# Patient Record
Sex: FEMALE | Race: OTHER | HISPANIC OR LATINO | Employment: OTHER | URBAN - NONMETROPOLITAN AREA
[De-identification: names, ages, dates, MRNs, and addresses within clinical notes are randomized per-mention and may not be internally consistent; named-entity substitution may affect disease eponyms.]

---

## 2023-10-02 ENCOUNTER — TELEPHONE (OUTPATIENT)
Dept: CARDIOLOGY | Facility: CLINIC | Age: 78
End: 2023-10-02

## 2023-10-02 NOTE — TELEPHONE ENCOUNTER
Patient is having a total knee on 10/23/23 Needs Cardiac clearance per anesthesia.    Please advise

## 2024-01-19 PROBLEM — R01.1 CARDIAC MURMUR: Status: ACTIVE | Noted: 2024-01-19

## 2024-01-19 PROBLEM — I35.0 AORTIC STENOSIS, MODERATE: Status: ACTIVE | Noted: 2024-01-19

## 2024-01-19 RX ORDER — LEVOTHYROXINE SODIUM 50 UG/1
50 TABLET ORAL DAILY
COMMUNITY

## 2024-02-15 ENCOUNTER — HOSPITAL ENCOUNTER (OUTPATIENT)
Dept: CARDIOLOGY | Facility: CLINIC | Age: 79
Discharge: HOME | End: 2024-02-15
Payer: MEDICARE

## 2024-02-15 VITALS
BODY MASS INDEX: 27.82 KG/M2 | DIASTOLIC BLOOD PRESSURE: 70 MMHG | HEIGHT: 59 IN | SYSTOLIC BLOOD PRESSURE: 142 MMHG | WEIGHT: 138 LBS

## 2024-02-15 DIAGNOSIS — I35.0 AORTIC VALVE STENOSIS, ETIOLOGY OF CARDIAC VALVE DISEASE UNSPECIFIED: ICD-10-CM

## 2024-02-15 LAB
AORTIC VALVE MEAN GRADIENT: 34 MMHG
AORTIC VALVE PEAK VELOCITY: 4.04 M/S
AV PEAK GRADIENT: 65.3 MMHG
AVA (PEAK VEL): 0.66 CM2
AVA (VTI): 0.65 CM2
EJECTION FRACTION APICAL 4 CHAMBER: 84.9
LEFT VENTRICLE INTERNAL DIMENSION DIASTOLE: 3.5 CM (ref 3.5–6)
LEFT VENTRICULAR OUTFLOW TRACT DIAMETER: 1.8 CM
MITRAL VALVE E/A RATIO: 0.73
MITRAL VALVE E/E' RATIO: 25.7
RIGHT VENTRICLE PEAK SYSTOLIC PRESSURE: 28 MMHG

## 2024-02-15 PROCEDURE — 93306 TTE W/DOPPLER COMPLETE: CPT

## 2024-02-15 PROCEDURE — 93306 TTE W/DOPPLER COMPLETE: CPT | Performed by: INTERNAL MEDICINE

## 2024-02-29 ENCOUNTER — OFFICE VISIT (OUTPATIENT)
Dept: CARDIOLOGY | Facility: CLINIC | Age: 79
End: 2024-02-29
Payer: MEDICARE

## 2024-02-29 VITALS
BODY MASS INDEX: 25.49 KG/M2 | HEART RATE: 82 BPM | WEIGHT: 135 LBS | DIASTOLIC BLOOD PRESSURE: 75 MMHG | HEIGHT: 61 IN | SYSTOLIC BLOOD PRESSURE: 165 MMHG

## 2024-02-29 DIAGNOSIS — I35.0 NONRHEUMATIC AORTIC VALVE STENOSIS: Primary | ICD-10-CM

## 2024-02-29 DIAGNOSIS — I10 ESSENTIAL HYPERTENSION: ICD-10-CM

## 2024-02-29 DIAGNOSIS — E66.3 OVERWEIGHT WITH BODY MASS INDEX (BMI) 25.0-29.9: ICD-10-CM

## 2024-02-29 DIAGNOSIS — Z87.891 FORMER SMOKER: ICD-10-CM

## 2024-02-29 PROCEDURE — 3078F DIAST BP <80 MM HG: CPT | Performed by: INTERNAL MEDICINE

## 2024-02-29 PROCEDURE — 1159F MED LIST DOCD IN RCRD: CPT | Performed by: INTERNAL MEDICINE

## 2024-02-29 PROCEDURE — 99214 OFFICE O/P EST MOD 30 MIN: CPT | Performed by: INTERNAL MEDICINE

## 2024-02-29 PROCEDURE — 3077F SYST BP >= 140 MM HG: CPT | Performed by: INTERNAL MEDICINE

## 2024-02-29 NOTE — LETTER
February 29, 2024     Hailey Rivers PA-C  [address] [place] [id number]    Patient: Jody Liu   YOB: 1945   Date of Visit: 2/29/2024       Dear Dr. Hailey Rivers PA-C:    Thank you for referring Jody Liu to me for evaluation. Below are my notes for this consultation.  If you have questions, please do not hesitate to call me. I look forward to following your patient along with you.       Sincerely,     Austen Taveras MD      CC: No Recipients  ______________________________________________________________________________________    Subjective   Jody Liu is a 78 y.o. female       Chief Complaint    Annual Exam          HPI   Patient is in the office for follow-up for aortic valve disease.  She is known to have aortic stenosis which based on recent echocardiogram demonstrated progression from moderate to severe.  She still has no symptoms consistent with aortic stenosis complication.  She has no orthopnea PND or palpitations and no chest pain and no dyspnea on exertion.  She had knee surgery in the past and she has been recovering well from the surgery.  I reviewed with the patient the echocardiographic findings and the natural history of aortic stenosis pointing out that at this stage she is getting closer to having an intervention which would likely be TAVR.  Her examination today is remarkable for aortic stenosis murmur along with borderline overweight.  Patient was found to be hypertensive as well which has not been noted previously.  I did point out to the patient that hypertension XLR8 aortic stenosis when its present.    Assessment/recommendations:     1-severe aortic stenosis confirmed by a repeat echocardiogram December 2023.  The velocity just exceeded 4 m/s.  To assess the significance of this finding I advised patient to have a treadmill stress test and based on that recommend on whether timing for surgery is approaching or not.  Will place her on a modified  "Eyal protocol.  The patient was advised at her age is not in her favor if we wait for too long.  I anticipate that TAVR will need to be done within 1 year  2-slight overweight which in her case not consequential. Patient has active lifestyle  3-hypothyroidism on replacement therapy followed by PCP  4-essential hypertension, the patient preferred to wait on therapy.  I did tell the patient that when she comes in for the treadmill stress test will check her blood pressure reading if it remains elevated she need to be on medical therapy and she agreed  Review of Systems   All other systems reviewed and are negative.           Vitals:    02/29/24 0912 02/29/24 0955   BP: 148/88 165/75   BP Location: Left arm    Patient Position: Sitting    Pulse: 82    Weight: 61.2 kg (135 lb)    Height: 1.549 m (5' 1\")         Objective   Physical Exam  Constitutional:       Appearance: Normal appearance.   HENT:      Nose: Nose normal.   Neck:      Vascular: No carotid bruit.   Cardiovascular:      Rate and Rhythm: Normal rate.      Pulses: Normal pulses.      Heart sounds: Normal heart sounds.      Comments: 2-3/6 systolic murmur  Pulmonary:      Effort: Pulmonary effort is normal.   Abdominal:      General: Bowel sounds are normal.      Palpations: Abdomen is soft.   Musculoskeletal:         General: Normal range of motion.      Cervical back: Normal range of motion.      Right lower leg: No edema.      Left lower leg: No edema.   Skin:     General: Skin is warm and dry.   Neurological:      General: No focal deficit present.      Mental Status: She is alert.   Psychiatric:         Mood and Affect: Mood normal.         Behavior: Behavior normal.         Thought Content: Thought content normal.         Judgment: Judgment normal.         Allergies  Patient has no known allergies.     Current Medications    Current Outpatient Medications:   •  levothyroxine (Synthroid) 50 mcg tablet, Take 1 tablet (50 mcg) by mouth once daily., Disp: " , Rfl:                      Assessment/Plan   1. Nonrheumatic aortic valve stenosis  Follow Up In Cardiology    Stress Test    Follow Up In Cardiology      2. Overweight with body mass index (BMI) 25.0-29.9        3. Former smoker        4. Essential hypertension                 Scribe Attestation  By signing my name below, Izabela THOMPSON LPN, Scribe   attest that this documentation has been prepared under the direction and in the presence of Austen Taveras MD.     Provider Attestation - Scribe documentation    All medical record entries made by the Scribe were at my direction and personally dictated by me. I have reviewed the chart and agree that the record accurately reflects my personal performance of the history, physical exam, discussion and plan.

## 2024-02-29 NOTE — PATIENT INSTRUCTIONS
Please bring all medicines, vitamins, and herbal supplements with you when you come to the office.    Prescriptions will not be filled unless you are compliant with your follow up appointments or have a follow up appointment scheduled as per instruction of your physician. Refills should be requested at the time of your visit.     BMI was above normal measurement. Current weight: 61.2 kg (135 lb)  Weight change since last visit (-) denotes wt loss -3 lbs   Weight loss needed to achieve BMI 25: 3 Lbs  Weight loss needed to achieve BMI 30: -23.4 Lbs  Provided instructions on dietary changes  Provided instructions on exercise    .

## 2024-02-29 NOTE — PROGRESS NOTES
Subjective   Jody Liu is a 78 y.o. female       Chief Complaint    Annual Exam          HPI   Patient is in the office for follow-up for aortic valve disease.  She is known to have aortic stenosis which based on recent echocardiogram demonstrated progression from moderate to severe.  She still has no symptoms consistent with aortic stenosis complication.  She has no orthopnea PND or palpitations and no chest pain and no dyspnea on exertion.  She had knee surgery in the past and she has been recovering well from the surgery.  I reviewed with the patient the echocardiographic findings and the natural history of aortic stenosis pointing out that at this stage she is getting closer to having an intervention which would likely be TAVR.  Her examination today is remarkable for aortic stenosis murmur along with borderline overweight.  Patient was found to be hypertensive as well which has not been noted previously.  I did point out to the patient that hypertension XLR8 aortic stenosis when its present.    Assessment/recommendations:     1-severe aortic stenosis confirmed by a repeat echocardiogram December 2023.  The velocity just exceeded 4 m/s.  To assess the significance of this finding I advised patient to have a treadmill stress test and based on that recommend on whether timing for surgery is approaching or not.  Will place her on a modified Eyal protocol.  The patient was advised at her age is not in her favor if we wait for too long.  I anticipate that TAVR will need to be done within 1 year  2-slight overweight which in her case not consequential. Patient has active lifestyle  3-hypothyroidism on replacement therapy followed by PCP  4-essential hypertension, the patient preferred to wait on therapy.  I did tell the patient that when she comes in for the treadmill stress test will check her blood pressure reading if it remains elevated she need to be on medical therapy and she agreed  Review of Systems  "  All other systems reviewed and are negative.           Vitals:    02/29/24 0912 02/29/24 0955   BP: 148/88 165/75   BP Location: Left arm    Patient Position: Sitting    Pulse: 82    Weight: 61.2 kg (135 lb)    Height: 1.549 m (5' 1\")         Objective   Physical Exam  Constitutional:       Appearance: Normal appearance.   HENT:      Nose: Nose normal.   Neck:      Vascular: No carotid bruit.   Cardiovascular:      Rate and Rhythm: Normal rate.      Pulses: Normal pulses.      Heart sounds: Normal heart sounds.      Comments: 2-3/6 systolic murmur  Pulmonary:      Effort: Pulmonary effort is normal.   Abdominal:      General: Bowel sounds are normal.      Palpations: Abdomen is soft.   Musculoskeletal:         General: Normal range of motion.      Cervical back: Normal range of motion.      Right lower leg: No edema.      Left lower leg: No edema.   Skin:     General: Skin is warm and dry.   Neurological:      General: No focal deficit present.      Mental Status: She is alert.   Psychiatric:         Mood and Affect: Mood normal.         Behavior: Behavior normal.         Thought Content: Thought content normal.         Judgment: Judgment normal.         Allergies  Patient has no known allergies.     Current Medications    Current Outpatient Medications:     levothyroxine (Synthroid) 50 mcg tablet, Take 1 tablet (50 mcg) by mouth once daily., Disp: , Rfl:                      Assessment/Plan   1. Nonrheumatic aortic valve stenosis  Follow Up In Cardiology    Stress Test    Follow Up In Cardiology      2. Overweight with body mass index (BMI) 25.0-29.9        3. Former smoker        4. Essential hypertension                 Scribe Attestation  By signing my name below, Izabela THOMPSON LPN, Scribjose   attest that this documentation has been prepared under the direction and in the presence of Austen Taveras MD.     Provider Attestation - Scribe documentation    All medical record entries made by the Scribe were at " my direction and personally dictated by me. I have reviewed the chart and agree that the record accurately reflects my personal performance of the history, physical exam, discussion and plan.

## 2024-03-05 ENCOUNTER — HOSPITAL ENCOUNTER (OUTPATIENT)
Dept: CARDIOLOGY | Facility: CLINIC | Age: 79
Discharge: HOME | End: 2024-03-05
Payer: MEDICARE

## 2024-03-05 ENCOUNTER — CLINICAL SUPPORT (OUTPATIENT)
Dept: CARDIOLOGY | Facility: CLINIC | Age: 79
End: 2024-03-05
Payer: MEDICARE

## 2024-03-05 VITALS
BODY MASS INDEX: 25.11 KG/M2 | HEIGHT: 61 IN | SYSTOLIC BLOOD PRESSURE: 122 MMHG | HEART RATE: 68 BPM | DIASTOLIC BLOOD PRESSURE: 74 MMHG | WEIGHT: 133 LBS

## 2024-03-05 VITALS — DIASTOLIC BLOOD PRESSURE: 86 MMHG | HEART RATE: 71 BPM | SYSTOLIC BLOOD PRESSURE: 132 MMHG

## 2024-03-05 DIAGNOSIS — I35.0 NONRHEUMATIC AORTIC VALVE STENOSIS: ICD-10-CM

## 2024-03-05 PROCEDURE — 99211 OFF/OP EST MAY X REQ PHY/QHP: CPT | Performed by: INTERNAL MEDICINE

## 2024-03-05 PROCEDURE — 93016 CV STRESS TEST SUPVJ ONLY: CPT | Performed by: INTERNAL MEDICINE

## 2024-03-05 PROCEDURE — 93017 CV STRESS TEST TRACING ONLY: CPT

## 2024-03-05 PROCEDURE — 93018 CV STRESS TEST I&R ONLY: CPT | Performed by: INTERNAL MEDICINE

## 2024-03-05 RX ORDER — MAGNESIUM 250 MG
1 TABLET ORAL DAILY
COMMUNITY

## 2024-03-05 RX ORDER — MULTIVIT-MIN/IRON FUM/FOLIC AC 7.5 MG-4
1 TABLET ORAL DAILY
COMMUNITY

## 2024-03-05 RX ORDER — NICOTINE POLACRILEX 2 MG
1 GUM BUCCAL DAILY
COMMUNITY

## 2024-03-05 NOTE — PROGRESS NOTES
"Patient here for at BP visit ordered by Dr. Taveras due to hypertension. Dr. Stevens in suite physician. Patient here due to elevated BP. No new medication was started. Medication list Updated with list . Patient has no cardiac complaints at time of visit. Discussed with CARINA Guillory RN prior to discharge.     To Dr. Taveras for review    To Dr. Stevens to sign as in suite        Vitals:    03/05/24 1112 03/05/24 1118   BP: 120/70 122/74   BP Location: Left arm Right arm   Patient Position: Sitting Sitting   Pulse: 68 68   Height: 1.549 m (5' 1\")        "

## 2024-09-05 ENCOUNTER — APPOINTMENT (OUTPATIENT)
Dept: CARDIOLOGY | Facility: CLINIC | Age: 79
End: 2024-09-05
Payer: MEDICARE

## 2024-09-20 ENCOUNTER — APPOINTMENT (OUTPATIENT)
Dept: CARDIOLOGY | Facility: CLINIC | Age: 79
End: 2024-09-20
Payer: MEDICARE

## 2024-09-20 VITALS
DIASTOLIC BLOOD PRESSURE: 72 MMHG | BODY MASS INDEX: 24.73 KG/M2 | HEART RATE: 64 BPM | WEIGHT: 131 LBS | HEIGHT: 61 IN | SYSTOLIC BLOOD PRESSURE: 136 MMHG

## 2024-09-20 DIAGNOSIS — I35.0 AORTIC STENOSIS, MODERATE: ICD-10-CM

## 2024-09-20 DIAGNOSIS — E03.9 ACQUIRED HYPOTHYROIDISM: ICD-10-CM

## 2024-09-20 DIAGNOSIS — I35.0 NONRHEUMATIC AORTIC VALVE STENOSIS: ICD-10-CM

## 2024-09-20 DIAGNOSIS — Z87.891 FORMER SMOKER: ICD-10-CM

## 2024-09-20 PROBLEM — R01.1 CARDIAC MURMUR: Status: RESOLVED | Noted: 2024-01-19 | Resolved: 2024-09-20

## 2024-09-20 PROBLEM — E66.3 OVERWEIGHT WITH BODY MASS INDEX (BMI) 25.0-29.9: Status: RESOLVED | Noted: 2024-02-29 | Resolved: 2024-09-20

## 2024-09-20 PROCEDURE — 1036F TOBACCO NON-USER: CPT | Performed by: INTERNAL MEDICINE

## 2024-09-20 PROCEDURE — 99213 OFFICE O/P EST LOW 20 MIN: CPT | Performed by: INTERNAL MEDICINE

## 2024-09-20 PROCEDURE — 1159F MED LIST DOCD IN RCRD: CPT | Performed by: INTERNAL MEDICINE

## 2024-09-20 NOTE — LETTER
"September 20, 2024     Hailey Rivers PA-C  [address] [place] [address]    Patient: Jody Liu   YOB: 1945   Date of Visit: 9/20/2024       Dear Dr. Hailey Rivers PA-C:    Thank you for referring Jody Liu to me for evaluation. Below are my notes for this consultation.  If you have questions, please do not hesitate to call me. I look forward to following your patient along with you.       Sincerely,     Austen Taveras MD      CC: No Recipients  ______________________________________________________________________________________    Subjective   Jody Liu is a 79 y.o. female       Chief Complaint    Follow-up          HPI   Patient is in the office for follow-up for aortic stenosis.  Since her last visit she had a treadmill stress test, she did not go over 4 minutes on the treadmill and had to stop for knee pain.  During the time on the treadmill there was no ischemic changes, no arrhythmias and no chest pain.  She remains asymptomatic as far as aortic stenosis is concerned.  Her last echocardiogram was December 2023.  Advised patient to repeat the echocardiogram in November 2024 and will likely require TAVR in the near future.    Assessment/recommendations:     1-severe aortic stenosis confirmed by echocardiogram December 2023.  The velocity just exceeded 4 m/s.  Limited treadmill stress test in February 2024 demonstrated limited capacity due to orthopedic problems not cardiovascular problems.  She remains asymptomatic.  Repeat echocardiogram in November 2024 will be scheduled  He required intervention to replace aortic valve.  2.  Hypothyroidism on replacement therapy.  Review of Systems   All other systems reviewed and are negative.           Vitals:    09/20/24 1511   BP: 136/72   BP Location: Left arm   Patient Position: Sitting   Pulse: 64   Weight: 59.4 kg (131 lb)   Height: 1.549 m (5' 1\")        Objective   Physical Exam  Constitutional:       Appearance: " Normal appearance.   HENT:      Nose: Nose normal.   Neck:      Vascular: No carotid bruit.   Cardiovascular:      Rate and Rhythm: Normal rate.      Pulses: Normal pulses.      Heart sounds: Murmur heard.      Systolic murmur is present with a grade of 3/6.   Pulmonary:      Effort: Pulmonary effort is normal.   Abdominal:      General: Bowel sounds are normal.      Palpations: Abdomen is soft.   Musculoskeletal:         General: Normal range of motion.      Cervical back: Normal range of motion.      Right lower leg: No edema.      Left lower leg: No edema.   Skin:     General: Skin is warm and dry.   Neurological:      General: No focal deficit present.      Mental Status: She is alert.   Psychiatric:         Mood and Affect: Mood normal.         Behavior: Behavior normal.         Thought Content: Thought content normal.         Judgment: Judgment normal.         Allergies  Patient has no known allergies.     Current Medications    Current Outpatient Medications:   •  biotin 1 mg capsule, Take 1 capsule (1 mg) by mouth once daily., Disp: , Rfl:   •  levothyroxine (Synthroid) 50 mcg tablet, Take 1 tablet (50 mcg) by mouth once daily., Disp: , Rfl:   •  naproxen sodium (ALEVE ORAL), Take 440 mg by mouth if needed., Disp: , Rfl:                      Assessment/Plan   1. Nonrheumatic aortic valve stenosis  Follow Up In Cardiology      2. Aortic stenosis, moderate        3. Acquired hypothyroidism        4. BMI 24.0-24.9, adult        5. Former smoker                 Scribe Attestation  By signing my name below, Jeni THOMPSON LPN, Scribe   attest that this documentation has been prepared under the direction and in the presence of Austen Taveras MD.     Provider Attestation - Scribe documentation    All medical record entries made by the Scribe were at my direction and personally dictated by me. I have reviewed the chart and agree that the record accurately reflects my personal performance of the history, physical  exam, discussion and plan.

## 2024-09-20 NOTE — PROGRESS NOTES
"Subjective   Jody Liu is a 79 y.o. female       Chief Complaint    Follow-up          HPI   Patient is in the office for follow-up for aortic stenosis.  Since her last visit she had a treadmill stress test, she did not go over 4 minutes on the treadmill and had to stop for knee pain.  During the time on the treadmill there was no ischemic changes, no arrhythmias and no chest pain.  She remains asymptomatic as far as aortic stenosis is concerned.  Her last echocardiogram was December 2023.  Advised patient to repeat the echocardiogram in November 2024 and will likely require TAVR in the near future.    Assessment/recommendations:     1-severe aortic stenosis confirmed by echocardiogram December 2023.  The velocity just exceeded 4 m/s.  Limited treadmill stress test in February 2024 demonstrated limited capacity due to orthopedic problems not cardiovascular problems.  She remains asymptomatic.  Repeat echocardiogram in November 2024 will be scheduled  He required intervention to replace aortic valve.  2.  Hypothyroidism on replacement therapy.  Review of Systems   All other systems reviewed and are negative.           Vitals:    09/20/24 1511   BP: 136/72   BP Location: Left arm   Patient Position: Sitting   Pulse: 64   Weight: 59.4 kg (131 lb)   Height: 1.549 m (5' 1\")        Objective   Physical Exam  Constitutional:       Appearance: Normal appearance.   HENT:      Nose: Nose normal.   Neck:      Vascular: No carotid bruit.   Cardiovascular:      Rate and Rhythm: Normal rate.      Pulses: Normal pulses.      Heart sounds: Murmur heard.      Systolic murmur is present with a grade of 3/6.   Pulmonary:      Effort: Pulmonary effort is normal.   Abdominal:      General: Bowel sounds are normal.      Palpations: Abdomen is soft.   Musculoskeletal:         General: Normal range of motion.      Cervical back: Normal range of motion.      Right lower leg: No edema.      Left lower leg: No edema.   Skin:     " General: Skin is warm and dry.   Neurological:      General: No focal deficit present.      Mental Status: She is alert.   Psychiatric:         Mood and Affect: Mood normal.         Behavior: Behavior normal.         Thought Content: Thought content normal.         Judgment: Judgment normal.         Allergies  Patient has no known allergies.     Current Medications    Current Outpatient Medications:     biotin 1 mg capsule, Take 1 capsule (1 mg) by mouth once daily., Disp: , Rfl:     levothyroxine (Synthroid) 50 mcg tablet, Take 1 tablet (50 mcg) by mouth once daily., Disp: , Rfl:     naproxen sodium (ALEVE ORAL), Take 440 mg by mouth if needed., Disp: , Rfl:                      Assessment/Plan   1. Nonrheumatic aortic valve stenosis  Follow Up In Cardiology      2. Aortic stenosis, moderate        3. Acquired hypothyroidism        4. BMI 24.0-24.9, adult        5. Former smoker                 Scribe Attestation  By signing my name below, IJeni LPN  , Scribe   attest that this documentation has been prepared under the direction and in the presence of Austen Taveras MD.     Provider Attestation - Scribe documentation    All medical record entries made by the Scribe were at my direction and personally dictated by me. I have reviewed the chart and agree that the record accurately reflects my personal performance of the history, physical exam, discussion and plan.

## 2024-11-07 ENCOUNTER — HOSPITAL ENCOUNTER (OUTPATIENT)
Dept: CARDIOLOGY | Facility: CLINIC | Age: 79
Discharge: HOME | End: 2024-11-07
Payer: MEDICARE

## 2024-11-07 VITALS
BODY MASS INDEX: 24.73 KG/M2 | WEIGHT: 131 LBS | SYSTOLIC BLOOD PRESSURE: 132 MMHG | HEIGHT: 61 IN | DIASTOLIC BLOOD PRESSURE: 78 MMHG

## 2024-11-07 DIAGNOSIS — I35.0 AORTIC STENOSIS, MODERATE: ICD-10-CM

## 2024-11-07 PROCEDURE — 93306 TTE W/DOPPLER COMPLETE: CPT

## 2024-11-07 PROCEDURE — 93306 TTE W/DOPPLER COMPLETE: CPT | Performed by: INTERNAL MEDICINE

## 2024-11-08 LAB
AORTIC VALVE MEAN GRADIENT: 37 MMHG
AORTIC VALVE PEAK VELOCITY: 4.18 M/S
AV PEAK GRADIENT: 70 MMHG
AVA (PEAK VEL): 0.66 CM2
AVA (VTI): 0.57 CM2
EJECTION FRACTION APICAL 4 CHAMBER: 82.5
EJECTION FRACTION: 73 %
LEFT VENTRICLE INTERNAL DIMENSION DIASTOLE: 3.26 CM (ref 3.5–6)
LEFT VENTRICULAR OUTFLOW TRACT DIAMETER: 1.9 CM
LV EJECTION FRACTION BIPLANE: 81 %
MITRAL VALVE E/A RATIO: 0.82
MITRAL VALVE E/E' RATIO: 27.9
RIGHT VENTRICLE PEAK SYSTOLIC PRESSURE: 33.9 MMHG

## 2024-11-11 ENCOUNTER — TELEPHONE (OUTPATIENT)
Dept: CARDIOLOGY | Facility: CLINIC | Age: 79
End: 2024-11-11
Payer: MEDICARE

## 2024-11-11 NOTE — TELEPHONE ENCOUNTER
Result Communication    Resulted Orders   Transthoracic Echo Complete   Result Value Ref Range    AV mn grad 37 mmHg    AV pk hemant 4.18 m/s    LV Biplane EF 81 %    LVOT diam 1.90 cm    MV E/A ratio 0.82     MV avg E/e' ratio 27.90     LV EF 73 %    LVIDd 3.26 cm    RVSP 33.9 mmHg    Aortic Valve Area by Continuity of Peak Velocity 0.66 cm2    AV pk grad 70 mmHg    Aortic Valve Area by Continuity of VTI 0.57 cm2    LV A4C EF 82.5     Narrative                   [address], [address] [place] [address]          Tel [phone] Fax [phone]    TRANSTHORACIC ECHOCARDIOGRAM REPORT    Patient Name:         ROSAURA BARNES      Reading Physician:    [id number] Genie Taveras MD,                                                                   [facility]  Study Date:           11/7/2024            Ordering Provider:    [id number] GENIE TAVERAS  MRN/PID:              [id number]             Fellow:  Accession#:           [id number]         Nurse:  Date of Birth/Age:    1945 / 79 years Sonographer:          PAMELA  Gender Assigned at    F                    Additional Staff:  Birth:  Height:               154.94 cm            Admit Date:  Weight:               59.42 kg             Admission Status:  BSA / BMI:            1.58 m2 / 24.75      Department Location:                        kg/m2  Blood Pressure: 132 /78 mmHg    Study Type:    TRANSTHORACIC ECHO (TTE) COMPLETE  Diagnosis/ICD: Nonrheumatic aortic (valve) stenosis-I35.0  Indication:    Hypothyroid, 3/6 Systolic Murmur, Former Smoker  CPT Codes:     Echo Complete w Full [id number]   Study Detail: The following Echo studies were performed: 2D, M-Mode, Doppler and                color flow.       PHYSICIAN INTERPRETATION:  Left Ventricle: Left ventricular ejection fraction is normal, by visual estimate at  70-75%. There are no regional wall motion abnormalities. The left ventricular cavity size is normal. There is severely increased septal and mildly increased posterior left ventricular wall thickness. There is left ventricular concentric remodeling. Spectral Doppler shows a Grade I (impaired relaxation pattern) of left ventricular diastolic filling with an elevated left atrial pressure. Mild concentric left ventricle hypertrophy.  Left Atrium: The left atrium is normal in size.  Right Ventricle: The right ventricle is normal in size. There is normal right ventricular global systolic function.  Right Atrium: The right atrium is normal in size.  Aortic Valve: The aortic valve appears abnormal. There is moderate to severe aortic valve thickening. There is evidence of severe aortic valve stenosis.  The aortic valve dimensionless index is 0.20. There is no evidence of aortic valve regurgitation. The peak instantaneous gradient of the aortic valve is 70 mmHg. The mean gradient of the aortic valve is 37 mmHg. Peak velocity across the aortic valve measured 418 cm/s corresponding to a peak pressure gradient of 70 mmHg and a mean pressure gradient 37 mmHg.  Mitral Valve: The mitral valve is moderately thickened. There is mild mitral annular calcification. The peak instantaneous gradient of the mitral valve is 14 mmHg. There is no evidence of mitral valve regurgitation.  Tricuspid Valve: The tricuspid valve is structurally normal. No evidence of tricuspid regurgitation.  Pulmonic Valve: The pulmonic valve is structurally normal. There is no indication of pulmonic valve regurgitation.  Pericardium: No pericardial effusion noted.  Aorta: The aortic root is normal.  Pulmonary Artery: The Doppler estimated pulmonary artery diastolic pressure is 14.3 mmHg.  In comparison to the previous echocardiogram(s): When compared to a study from 2/15/2024, the velocity across the aortic valve has slightly increased.       CONCLUSIONS:   1.  Left ventricular ejection fraction is normal, by visual estimate at 70-75%.   2. Mild concentric left ventricle hypertrophy.   3. There is normal right ventricular global systolic function.   4. The mitral valve is moderately thickened.   5. Peak velocity across the aortic valve measured 418 cm/s corresponding to a peak pressure gradient of 70 mmHg and a mean pressure gradient 37 mmHg.   6. Severe aortic valve stenosis.   7. There is moderate to severe aortic valve thickening.   8. When compared to a study from 2/15/2024, the velocity across the aortic valve has slightly increased.   9. There is severely increased septal thickness.    QUANTITATIVE DATA SUMMARY:     2D MEASUREMENTS:           Normal Ranges:  Ao Root d:       2.20 cm   (2.0-3.7cm)  LAs:             3.10 cm   (2.7-4.0cm)  RVIDd:           1.84 cm   (0.9-3.6cm)  IVSd:            1.51 cm   (0.6-1.1cm)  LVPWd:           1.01 cm   (0.6-1.1cm)  LVIDd:           3.26 cm   (3.9-5.9cm)  LVIDs:           2.11 cm  LV Mass Index:   83.9 g/m2  LV % FS          35.3 %       LV SYSTOLIC FUNCTION BY 2D PLANIMETRY (MOD):                       Normal Ranges:  EF-A4C View:    82 % (>=55%)  EF-A2C View:    80 %  EF-Biplane:     81 %  EF-Visual:      73 %  LV EF Reported: 73 %       LV DIASTOLIC FUNCTION:           Normal Ranges:  MV Peak E:             1.14 m/s  (0.7-1.2 m/s)  MV Peak A:             1.39 m/s  (0.42-0.7 m/s)  E/A Ratio:             0.82      (1.0-2.2)  MV e'                  0.044 m/s (>8.0)  MV lateral e'          0.04 m/s  MV medial e'           0.05 m/s  E/e' Ratio:            25.70     (<8.0)       MITRAL VALVE:           Normal Ranges:  MV Vmax:      1.87 m/s  (<=1.3m/s)  MV peak P.0 mmHg (<5mmHg)  MV mean P.0 mmHg  (<48mmHg)       MITRAL INSUFFICIENCY:             Normal Ranges:  MR Vmax:              304.00 cm/s       AORTIC VALVE:                      Normal Ranges:  AoV Vmax:                4.18 m/s  (<=1.7m/s)  AoV Peak PG:              69.9 mmHg (<20mmHg)  AoV Mean P.0 mmHg (1.7-11.5mmHg)  LVOT Max Wilfredo:            0.98 m/s  (<=1.1m/s)  AoV VTI:                 110.00 cm (18-25cm)  LVOT VTI:                22.30 cm  LVOT Diameter:           1.90 cm   (1.8-2.4cm)  AoV Area, VTI:           0.57 cm2  (2.5-5.5cm2)  AoV Area,Vmax:           0.66 cm2  (2.5-4.5cm2)  AoV Dimensionless Index: 0.20       TRICUSPID VALVE/RVSP:          Normal Ranges:  Peak TR Velocity:     2.78 m/s  RV Syst Pressure:     34 mmHg  (< 30mmHg)       PULMONIC VALVE:           Normal Ranges:  PV Max Wilfredo:     0.7 m/s   (0.6-0.9m/s)  PV Max P.2 mmHg  PIEDV:          1.68 m/s  PADP:           14.3 mmHg       [id number] Austen Taveras MD, FACC  Electronically signed on 2024 at 6:20:19 PM         ** Final **

## 2024-11-11 NOTE — TELEPHONE ENCOUNTER
----- Message from Austen Taveras sent at 11/10/2024  2:39 PM EST -----  Let her know the echo shows no changes from previously, if she remains without any symptoms there is nothing to be done at this moment  ----- Message -----  From: Interface, [facility] - Cardiology Results In  Sent: 11/8/2024   6:20 PM EST  To: Austen Taveras MD

## 2025-04-11 ENCOUNTER — APPOINTMENT (OUTPATIENT)
Dept: CARDIOLOGY | Facility: CLINIC | Age: 80
End: 2025-04-11
Payer: MEDICARE

## 2025-05-06 ENCOUNTER — APPOINTMENT (OUTPATIENT)
Dept: CARDIOLOGY | Facility: CLINIC | Age: 80
End: 2025-05-06
Payer: MEDICARE

## 2025-05-06 VITALS
SYSTOLIC BLOOD PRESSURE: 124 MMHG | WEIGHT: 126.6 LBS | BODY MASS INDEX: 23.9 KG/M2 | DIASTOLIC BLOOD PRESSURE: 80 MMHG | HEIGHT: 61 IN | HEART RATE: 70 BPM

## 2025-05-06 DIAGNOSIS — E03.9 ACQUIRED HYPOTHYROIDISM: ICD-10-CM

## 2025-05-06 DIAGNOSIS — Z87.891 FORMER SMOKER: ICD-10-CM

## 2025-05-06 DIAGNOSIS — I35.0 AORTIC STENOSIS, MODERATE: ICD-10-CM

## 2025-05-06 PROCEDURE — 1036F TOBACCO NON-USER: CPT | Performed by: INTERNAL MEDICINE

## 2025-05-06 PROCEDURE — 99213 OFFICE O/P EST LOW 20 MIN: CPT | Performed by: INTERNAL MEDICINE

## 2025-05-06 PROCEDURE — 1159F MED LIST DOCD IN RCRD: CPT | Performed by: INTERNAL MEDICINE

## 2025-05-06 NOTE — PROGRESS NOTES
"Chief Complaint   Patient presents with    Follow-up     6 month, aortic stenosis        Subjective   Jody Liu is a 79 y.o. female     HPI     Patient is in the office for follow-up for aortic stenosis.  Back in November 2020 for her last echocardiogram revealed severe aortic stenosis and a velocity of 418 cm/s.  The patient maintains active lifestyle and send currently denies any dyspnea palpitations chest pain syncope or near syncope.  Her only other medical problem is hypothyroidism on replacement therapy managed by her PCP.  Her examination is consistent with severe aortic stenosis.  Lungs are clear, no lower extremity edema.     Assessment/recommendations:     1-severe aortic stenosis confirmed by echocardiogram November 2024, peak velocity 418 cm/s with normal ejection fraction and remains asymptomatic.  Discussed with the patient the natural history of aortic stenosis and the fact that this valve is progressively getting smaller and likely require a replacement in the near future.  Due to the absence of symptoms at the present time I suggested repeating the echocardiogram this coming summer and based on that decide whether surgery is going to be needed.  Meanwhile symptoms of progressive aortic stenosis were explained to the patient and was advised to report if any of the symptoms occur.  2.  Hypothyroidism on replacement therapy.  Review of Systems   All other systems reviewed and are negative.           Vitals:    05/06/25 1135   BP: 124/80   BP Location: Left arm   Patient Position: Sitting   Pulse: 70   Weight: 57.4 kg (126 lb 9.6 oz)   Height: 1.549 m (5' 1\")        Objective   Physical Exam  Constitutional:       Appearance: Normal appearance.   HENT:      Nose: Nose normal.   Neck:      Vascular: No carotid bruit.   Cardiovascular:      Rate and Rhythm: Normal rate.      Pulses: Normal pulses.      Heart sounds: Murmur heard.      Systolic murmur is present with a grade of 3/6.   Pulmonary:      " Effort: Pulmonary effort is normal.   Abdominal:      General: Bowel sounds are normal.      Palpations: Abdomen is soft.   Musculoskeletal:         General: Normal range of motion.      Cervical back: Normal range of motion.      Right lower leg: No edema.      Left lower leg: No edema.   Skin:     General: Skin is warm and dry.   Neurological:      General: No focal deficit present.      Mental Status: She is alert.   Psychiatric:         Mood and Affect: Mood normal.         Behavior: Behavior normal.         Thought Content: Thought content normal.         Judgment: Judgment normal.         Allergies  Patient has no known allergies.     Current Medications  Current Outpatient Medications   Medication Instructions    biotin 1 mg capsule 1 capsule, Daily    levothyroxine (SYNTHROID) 50 mcg, Daily    naproxen sodium (ALEVE ORAL) 440 mg, As needed                        Assessment/Plan   1. Aortic stenosis, moderate  Follow Up In Cardiology    Follow Up In Cardiology    Transthoracic Echo Complete      2. Acquired hypothyroidism        3. BMI 24.0-24.9, adult        4. Former smoker                 Scribe Attestation  By signing my name below, IMuna RN   , Scribe   attest that this documentation has been prepared under the direction and in the presence of Austen Taveras MD.     Provider Attestation - Scribe documentation    All medical record entries made by the Scribe were at my direction and personally dictated by me. I have reviewed the chart and agree that the record accurately reflects my personal performance of the history, physical exam, discussion and plan.

## 2025-05-06 NOTE — LETTER
May 6, 2025     Hailey Rivers PA-C  [address] [place] [id number]    Patient: Jody Liu   YOB: 1945   Date of Visit: 5/6/2025       Dear Dr. Hailey Rivers PA-C:    Thank you for referring Jody Liu to me for evaluation. Below are my notes for this consultation.  If you have questions, please do not hesitate to call me. I look forward to following your patient along with you.       Sincerely,     Austen Taveras MD      CC: No Recipients  ______________________________________________________________________________________    Chief Complaint   Patient presents with   • Follow-up     6 month, aortic stenosis        Subjective   Jody Liu is a 79 y.o. female     HPI     Patient is in the office for follow-up for aortic stenosis.  Back in November 2020 for her last echocardiogram revealed severe aortic stenosis and a velocity of 418 cm/s.  The patient maintains active lifestyle and send currently denies any dyspnea palpitations chest pain syncope or near syncope.  Her only other medical problem is hypothyroidism on replacement therapy managed by her PCP.  Her examination is consistent with severe aortic stenosis.  Lungs are clear, no lower extremity edema.     Assessment/recommendations:     1-severe aortic stenosis confirmed by echocardiogram November 2024, peak velocity 418 cm/s with normal ejection fraction and remains asymptomatic.  Discussed with the patient the natural history of aortic stenosis and the fact that this valve is progressively getting smaller and likely require a replacement in the near future.  Due to the absence of symptoms at the present time I suggested repeating the echocardiogram this coming summer and based on that decide whether surgery is going to be needed.  Meanwhile symptoms of progressive aortic stenosis were explained to the patient and was advised to report if any of the symptoms occur.  2.  Hypothyroidism on replacement therapy.  Review  "of Systems   All other systems reviewed and are negative.           Vitals:    05/06/25 1135   BP: 124/80   BP Location: Left arm   Patient Position: Sitting   Pulse: 70   Weight: 57.4 kg (126 lb 9.6 oz)   Height: 1.549 m (5' 1\")        Objective   Physical Exam  Constitutional:       Appearance: Normal appearance.   HENT:      Nose: Nose normal.   Neck:      Vascular: No carotid bruit.   Cardiovascular:      Rate and Rhythm: Normal rate.      Pulses: Normal pulses.      Heart sounds: Murmur heard.      Systolic murmur is present with a grade of 3/6.   Pulmonary:      Effort: Pulmonary effort is normal.   Abdominal:      General: Bowel sounds are normal.      Palpations: Abdomen is soft.   Musculoskeletal:         General: Normal range of motion.      Cervical back: Normal range of motion.      Right lower leg: No edema.      Left lower leg: No edema.   Skin:     General: Skin is warm and dry.   Neurological:      General: No focal deficit present.      Mental Status: She is alert.   Psychiatric:         Mood and Affect: Mood normal.         Behavior: Behavior normal.         Thought Content: Thought content normal.         Judgment: Judgment normal.         Allergies  Patient has no known allergies.     Current Medications  Current Outpatient Medications   Medication Instructions   • biotin 1 mg capsule 1 capsule, Daily   • levothyroxine (SYNTHROID) 50 mcg, Daily   • naproxen sodium (ALEVE ORAL) 440 mg, As needed                        Assessment/Plan   1. Aortic stenosis, moderate  Follow Up In Cardiology    Follow Up In Cardiology    Transthoracic Echo Complete      2. Acquired hypothyroidism        3. BMI 24.0-24.9, adult        4. Former smoker                 Scribe Attestation  By signing my name below, IMuna RN   , Scribe   attest that this documentation has been prepared under the direction and in the presence of Austen Taveras MD.     Provider Attestation - Scribe documentation    All medical " record entries made by the Scribe were at my direction and personally dictated by me. I have reviewed the chart and agree that the record accurately reflects my personal performance of the history, physical exam, discussion and plan.

## 2025-07-17 ENCOUNTER — HOSPITAL ENCOUNTER (OUTPATIENT)
Dept: CARDIOLOGY | Facility: CLINIC | Age: 80
Discharge: HOME | End: 2025-07-17
Payer: MEDICARE

## 2025-07-17 VITALS
HEIGHT: 61 IN | WEIGHT: 126 LBS | SYSTOLIC BLOOD PRESSURE: 128 MMHG | BODY MASS INDEX: 23.79 KG/M2 | DIASTOLIC BLOOD PRESSURE: 80 MMHG

## 2025-07-17 DIAGNOSIS — I35.0 AORTIC STENOSIS, MODERATE: ICD-10-CM

## 2025-07-17 PROCEDURE — 93306 TTE W/DOPPLER COMPLETE: CPT

## 2025-07-17 PROCEDURE — 93306 TTE W/DOPPLER COMPLETE: CPT | Performed by: INTERNAL MEDICINE

## 2025-07-18 LAB
AORTIC VALVE MEAN GRADIENT: 54 MMHG
AORTIC VALVE PEAK VELOCITY: 4.81 M/S
AV PEAK GRADIENT: 92 MMHG
AVA (PEAK VEL): 0.64 CM2
AVA (VTI): 0.74 CM2
EJECTION FRACTION APICAL 4 CHAMBER: 86.1
EJECTION FRACTION: 68 %
LEFT ATRIUM VOLUME AREA LENGTH INDEX BSA: 24.8 ML/M2
LEFT VENTRICLE INTERNAL DIMENSION DIASTOLE: 2.95 CM (ref 3.5–6)
LEFT VENTRICULAR OUTFLOW TRACT DIAMETER: 1.85 CM
LV EJECTION FRACTION BIPLANE: 82 %
MITRAL VALVE E/A RATIO: 0.65
MITRAL VALVE E/E' RATIO: 17.89
RIGHT VENTRICLE FREE WALL PEAK S': 13.25 CM/S
RIGHT VENTRICLE PEAK SYSTOLIC PRESSURE: 31 MMHG
TRICUSPID ANNULAR PLANE SYSTOLIC EXCURSION: 2 CM

## 2025-07-21 ENCOUNTER — RESULTS FOLLOW-UP (OUTPATIENT)
Dept: CARDIOLOGY | Facility: CLINIC | Age: 80
End: 2025-07-21
Payer: MEDICARE

## 2025-07-21 DIAGNOSIS — I35.0 AORTIC STENOSIS, MODERATE: ICD-10-CM

## 2025-07-21 NOTE — TELEPHONE ENCOUNTER
Result Communication    Resulted Orders   Transthoracic Echo Complete   Result Value Ref Range    AV mn grad 54 mmHg    AV pk hemant 4.81 m/s    LV Biplane EF 82 %    LVOT diam 1.85 cm    MV E/A ratio 0.65     Tricuspid annular plane systolic excursion 2.0 cm    MV avg E/e' ratio 17.89     LA vol index A/L 24.8 ml/m2    LV EF 68 %    RV free wall pk S' 13.25 cm/s    RVSP 31 mmHg    LVIDd 2.95 cm    Aortic Valve Area by Continuity of Peak Velocity 0.64 cm2    AV pk grad 92 mmHg    Aortic Valve Area by Continuity of VTI 0.74 cm2    LV A4C EF 86.1     Narrative                   [address], [address], [place] [address]          Tel [phone] Fax [phone]    TRANSTHORACIC ECHOCARDIOGRAM REPORT    Patient Name:       ROSAURA TANVI Hernandez Physician:    [id number] Genie Taveras MD, [facility]  Study Date:         7/17/2025           Ordering Provider:    [id number] GENIE TAVERAS  MRN/PID:            [id number]            Fellow:  Accession#:         [id number]        Nurse:  Date of Birth/Age:  1945 / 80      Sonographer:          Kori encarnacion                                     RDCS, RVT  Gender Assigned at  F                   Additional Staff:  Birth:  Height:             154.94 cm           Admit Date:  Weight:             57.15 kg            Admission Status:     Outpatient  BSA / BMI:          1.55 m2 / 23.81     Department Location:  [facility]                      kg/m2                                     Hall  Blood Pressure: 128 /80 mmHg    Study Type:    TRANSTHORACIC ECHO (TTE) COMPLETE  Diagnosis/ICD: Nonrheumatic aortic (valve) stenosis-I35.0  Indication:    3/6 Systolic Murmur, Hypothyroid, Former Smoker  CPT Codes:     Echo Complete w Full [id number]   Study Detail: The following Echo studies were  performed: 2D, M-Mode, Doppler and                color flow.       PHYSICIAN INTERPRETATION:  Left Ventricle: Left ventricular ejection fraction is normal by visual estimate at 65-70%. There are no regional wall motion abnormalities. The left ventricular cavity size is normal. There is moderately increased septal and moderately increased posterior left ventricular wall thickness. There is left ventricular concentric remodeling. Spectral Doppler shows a Grade I (impaired relaxation pattern) of left ventricular diastolic filling with normal left atrial filling pressure.  Left Atrium: The left atrial size is mildly dilated.  Right Ventricle: The right ventricle is normal in size. There is normal right ventricular global systolic function.  Right Atrium: The right atrial size is normal.  Aortic Valve: The aortic valve is trileaflet. The aortic valve area by VTI is 0.74 cmï¿½ with a peak velocity of 4.81 m/s. The peak and mean gradients are 89 mmHg and 54 mmHg, respectively, with a dimensionless index of 0.27. There is severe aortic valve cusp calcification. There is no evidence of aortic valve regurgitation. Peak velocity across the valve aortic valve measured 481 cm/s corresponding to a peak pressure gradient of 93 mmHg and a mean pressure gradient of 54 mmHg. The aortic valve area measured 0.7 cmï¿½ consistent with severe aortic stenosis.  Mitral Valve: The mitral valve is mildly thickened. The doppler estimated peak and mean diastolic gradients are 10 mmHg and 3 mmHg, respectively. There is trace mitral valve regurgitation. The E Vmax is 0.83 m/s.  Tricuspid Valve: The tricuspid valve is structurally normal. There is trace tricuspid regurgitation.  Pulmonic Valve: The pulmonic valve is structurally normal. There is no indication of pulmonic valve regurgitation.  Pericardium: No pericardial effusion noted.  Aorta: The aortic root is normal.  Systemic Veins: The inferior vena cava appears normal in size, with IVC  inspiratory collapse greater than 50%.  In comparison to the previous echocardiogram(s): When compared to a study from 11/7/2024, the peak velocity across the aortic valve has increased from 418 cm/s up to 481 cm/s. Aortic valve replacement should be considered if clinically appropriate.       CONCLUSIONS:   1. Left ventricular ejection fraction is normal by visual estimate at 65-70%.   2. Spectral Doppler shows a Grade I (impaired relaxation pattern) of left ventricular diastolic filling with normal left atrial filling pressure.   3. There is normal right ventricular global systolic function.   4. Peak velocity across the valve aortic valve measured 481 cm/s corresponding to a peak pressure gradient of 93 mmHg and a mean pressure gradient of 54 mmHg. The aortic valve area measured 0.7 cmï¿½ consistent with severe aortic stenosis.   5. There is severe aortic valve cusp calcification.   6. When compared to a study from 11/7/2024, the peak velocity across the aortic valve has increased from 418 cm/s up to 481 cm/s. Aortic valve replacement should be considered if clinically appropriate.   7. There is moderately increased septal and moderately increased posterior left ventricular wall thickness.    QUANTITATIVE DATA SUMMARY:     2D MEASUREMENTS:             Normal Ranges:  Ao Root s:       2.50 cm  LAs:             2.92 cm     (2.7-4.0cm)  RVIDd:           1.42 cm     (0.9-3.6cm)  IVSd:            1.60 cm     (0.6-1.1cm)  LVPWd:           1.19 cm     (0.6-1.1cm)  LVIDd:           2.95 cm     (3.9-5.9cm)  LVIDs:           1.95 cm  LV Mass Index:   87.9 g/m2  LVEDV Index:     28.37 ml/m2  LV % FS          34.0 %       LEFT ATRIUM:                 Normal Ranges:  LA Vol A4C:       51.1 ml    (22+/-6mL/m2)  LA Vol A2C:       24.8 ml  LA Vol BP:        38.5 ml  LA Vol Index A4C: 32.9ml/m2  LA Vol Index A2C: 16.0 ml/m2  LA Vol Index BP:  24.8 ml/m2  LA Vol A4C:       49.1 ml  LA Vol A2C:       23.7 ml  LA Vol Index BSA: 23.4  ml/m2       LV SYSTOLIC FUNCTION:                       Normal Ranges:  EF-A4C View:    86 % (>=55%)  EF-A2C View:    76 %  EF-Biplane:     82 %  EF-Visual:      68 %  LV EF Reported: 68 %       LV DIASTOLIC FUNCTION:           Normal Ranges:  MV Peak E:             0.83 m/s  (0.7-1.2 m/s)  MV Peak A:             1.29 m/s  (0.42-0.7 m/s)  E/A Ratio:             0.65      (1.0-2.2)  MV e'                  0.047 m/s (>8.0)  MV lateral e'          0.05 m/s  MV medial e'           0.05 m/s  E/e' Ratio:            17.89     (<8.0)       MITRAL VALVE:          Normal Ranges:  MV Vmax:      1.57 m/s (<=1.3m/s)  MV peak P.9 mmHg (<5mmHg)  MV mean PG:   3.1 mmHg (<48mmHg)  MV VTI:       44.23 cm (10-13cm)  MV DT:        286 msec (150-240msec)       AORTIC VALVE:                      Normal Ranges:  AoV Vmax:                4.81 m/s  (<=1.7m/s)  AoV Peak P.5 mmHg (<20mmHg)  AoV Mean P.7 mmHg (1.7-11.5mmHg)  LVOT Max Wilfredo:            1.13 m/s  (<=1.1m/s)  AoV VTI:                 113.27 cm (18-25cm)  LVOT VTI:                31.01 cm  LVOT Diameter:           1.85 cm   (1.8-2.4cm)  AoV Area, VTI:           0.74 cm2  (2.5-5.5cm2)  AoV Area,Vmax:           0.64 cm2  (2.5-4.5cm2)  AoV Dimensionless Index: 0.27       RIGHT VENTRICLE:  RV Basal 2.36 cm  RV Mid   1.70 cm  RV Major 5.5 cm  TAPSE:   20.3 mm  RV s'    0.13 m/s       TRICUSPID VALVE/RVSP:          Normal Ranges:  Peak TR Velocity:     2.66 m/s  Est. RA Pressure:     3 mmHg  RV Syst Pressure:     31 mmHg  (< 30mmHg)  IVC Diam:             1.90 cm       PULMONIC VALVE:          Normal Ranges:  RVOT Vmax:      0.83 m/s (0.6-0.9m/s)       AORTA:  Asc Ao Diam 2.60 cm       [id number] Austen Taveras MD, FACC  Electronically signed on 2025 at 5:35:14 PM         ** Final **

## 2025-07-21 NOTE — TELEPHONE ENCOUNTER
----- Message from Austen Taveras sent at 7/21/2025  1:11 PM EDT -----  Let her know the valve is getting very tight and we should not wait for symptoms and I prefer that she is referred to  for consideration of TAVR  ----- Message -----  From: Julianne, [facility] - Cardiology Results In  Sent: 7/18/2025   5:35 PM EDT  To: Austen Taveras MD

## 2025-07-23 NOTE — TELEPHONE ENCOUNTER
----- Message from Austen Taveras sent at 7/21/2025  1:11 PM EDT -----  Let her know the valve is getting very tight and we should not wait for symptoms and I prefer that she is referred to  for consideration of TAVR          Attempted to phone patient with results, unable to reach. Left message to return call.

## 2025-07-24 NOTE — TELEPHONE ENCOUNTER
Patient presents to the office today to discuss ECHO results.  Discussed the need for referral to  for TAVR.  Patient is hesitant to schedule due to location of the hospital; she states she will not be comfortable driving in [place], and would like to know other location options before moving forward.  Advised we would send the referral, and that can be discussed at that time.      Patient did state her  is going to have surgery soon in the [place] location, and she feels his is more important than hers at the moment.  Patient denies SOB, chest pains, palpitations.     Referral is prepared for signature.  Patient would like Dr. Austen Taveras MD to review her concerns, as well.     To SO clerical for referral.   To Dr. Austen Taveras MD for review.

## 2025-07-25 DIAGNOSIS — I35.0 NONRHEUMATIC AORTIC (VALVE) STENOSIS: Primary | ICD-10-CM

## 2025-07-25 LAB
NON-UH HIE ALBUMIN LEVEL: 4.1 G/DL (ref 3.5–5.7)
NON-UH HIE ANION GAP: 9.9 (ref 6–15)
NON-UH HIE BASOPHILS # (AUTO): 0 10*3/UL (ref 0–0.2)
NON-UH HIE BASOPHILS % (AUTO): 0.5 %
NON-UH HIE BLOOD UREA NITROGEN: 23 MG/DL (ref 7–25)
NON-UH HIE CALCIUM: 9.2 MG/DL (ref 8.6–10.3)
NON-UH HIE CARBON DIOXIDE: 28.6 MMOL/L (ref 21–31)
NON-UH HIE CHLORIDE: 107 MMOL/L (ref 98–107)
NON-UH HIE CREATININE: 0.56 MG/DL (ref 0.6–1.2)
NON-UH HIE EOSINOPHILS # (AUTO): 0.1 10*3/UL (ref 0–0.45)
NON-UH HIE EOSINOPHILS % (AUTO): 1.1 %
NON-UH HIE ESTIMATED GFR: >60
NON-UH HIE GLUCOSE: 90 MG/DL (ref 70–100)
NON-UH HIE HEMATOCRIT: 37.5 % (ref 34–46.4)
NON-UH HIE HEMOGLOBIN: 12.7 G/DL (ref 11.8–15.4)
NON-UH HIE LYMPHOCYTES # (AUTO): 2.7 10*3/UL (ref 1–4.8)
NON-UH HIE LYMPHOCYTES % (AUTO): 46.6 %
NON-UH HIE MEAN CORPUSCULAR HEMOGLOBIN: 33.6 PG (ref 24.7–34.3)
NON-UH HIE MEAN CORPUSCULAR HGB CONC: 33.8 G/DL (ref 32–35)
NON-UH HIE MEAN CORPUSCULAR VOLUME: 99.5 FL (ref 80–100)
NON-UH HIE MEAN PLATELET VOLUME: 7.7 FL (ref 6.3–10.7)
NON-UH HIE MONOCYTES # (AUTO): 0.4 10*3/UL (ref 0–0.8)
NON-UH HIE MONOCYTES % (AUTO): 7.1 %
NON-UH HIE NEUTROPHILS # (AUTO): 2.6 10*3/UL (ref 1.8–7.7)
NON-UH HIE NEUTROPHILS % (AUTO): 44.7 %
NON-UH HIE NRBC%: 0.2 /100{WBC} (ref 0–0.5)
NON-UH HIE PHOSPHORUS: 3.9 MG/DL (ref 2.5–4.5)
NON-UH HIE PLATELET COUNT: 208 10*3/UL (ref 150–450)
NON-UH HIE POTASSIUM: 4.5 MMOL/L (ref 3.5–5.1)
NON-UH HIE RED BLOOD COUNT: 3.77 10*6/UL (ref 3.6–5)
NON-UH HIE RED CELL DISTRIBUTION WIDTH: 13.6 % (ref 11.9–15.3)
NON-UH HIE SODIUM: 141 MMOL/L (ref 136–145)
NON-UH HIE UNCORRECTED WBC: 5.9 10*3/UL (ref 3.8–11.6)
NON-UH HIE WHITE BLOOD COUNT: 5.9 [CFU]/ML (ref 3.8–11.6)

## 2025-07-28 DIAGNOSIS — I35.0 NONRHEUMATIC AORTIC (VALVE) STENOSIS: Primary | ICD-10-CM

## 2025-07-28 PROBLEM — N13.2 URETERAL STONE WITH HYDRONEPHROSIS: Status: RESOLVED | Noted: 2025-07-28 | Resolved: 2025-07-28

## 2025-07-28 PROBLEM — M25.562 ACUTE POSTOPERATIVE PAIN OF LEFT KNEE: Status: RESOLVED | Noted: 2023-10-24 | Resolved: 2025-07-28

## 2025-07-28 PROBLEM — Z96.652 S/P TKR (TOTAL KNEE REPLACEMENT), LEFT: Status: RESOLVED | Noted: 2023-10-24 | Resolved: 2025-07-28

## 2025-07-28 PROBLEM — G89.29 CHRONIC PAIN OF LEFT KNEE: Status: RESOLVED | Noted: 2023-06-01 | Resolved: 2025-07-28

## 2025-07-28 PROBLEM — M25.562 CHRONIC PAIN OF LEFT KNEE: Status: RESOLVED | Noted: 2023-06-01 | Resolved: 2025-07-28

## 2025-07-28 PROBLEM — N20.0 KIDNEY STONES: Status: RESOLVED | Noted: 2025-03-18 | Resolved: 2025-07-28

## 2025-07-28 PROBLEM — K21.9 GERD (GASTROESOPHAGEAL REFLUX DISEASE): Status: ACTIVE | Noted: 2023-12-29

## 2025-07-28 PROBLEM — E07.9 DISORDER OF THYROID: Status: ACTIVE | Noted: 2024-09-20

## 2025-07-28 PROBLEM — M54.50 CHRONIC RIGHT-SIDED LOW BACK PAIN WITHOUT SCIATICA: Status: RESOLVED | Noted: 2025-03-04 | Resolved: 2025-07-28

## 2025-07-28 PROBLEM — E78.00 HYPERCHOLESTEROLEMIA: Status: ACTIVE | Noted: 2025-07-28

## 2025-07-28 PROBLEM — B97.7 HPV IN FEMALE: Status: RESOLVED | Noted: 2025-07-28 | Resolved: 2025-07-28

## 2025-07-28 PROBLEM — Z96.659 ARTIFICIAL KNEE JOINT PRESENT: Status: RESOLVED | Noted: 2023-05-31 | Resolved: 2025-07-28

## 2025-07-28 PROBLEM — M19.90 OSTEOARTHRITIS: Status: RESOLVED | Noted: 2025-07-28 | Resolved: 2025-07-28

## 2025-07-28 PROBLEM — M25.551 RIGHT HIP PAIN: Status: RESOLVED | Noted: 2025-03-04 | Resolved: 2025-07-28

## 2025-07-28 PROBLEM — B97.7 HUMAN PAPILLOMA VIRUS INFECTION: Status: RESOLVED | Noted: 2025-07-28 | Resolved: 2025-07-28

## 2025-07-28 PROBLEM — R11.2 NAUSEA AND VOMITING: Status: RESOLVED | Noted: 2025-07-28 | Resolved: 2025-07-28

## 2025-07-28 PROBLEM — G89.18 ACUTE POSTOPERATIVE PAIN OF LEFT KNEE: Status: RESOLVED | Noted: 2023-10-24 | Resolved: 2025-07-28

## 2025-07-28 PROBLEM — L30.9 ECZEMA: Status: RESOLVED | Noted: 2025-07-28 | Resolved: 2025-07-28

## 2025-07-28 PROBLEM — R10.9 ABDOMINAL PAIN: Status: RESOLVED | Noted: 2025-07-28 | Resolved: 2025-07-28

## 2025-07-28 PROBLEM — M17.12 LOCALIZED OSTEOARTHRITIS OF LEFT KNEE: Status: RESOLVED | Noted: 2023-05-31 | Resolved: 2025-07-28

## 2025-07-28 PROBLEM — M81.0 AGE-RELATED OSTEOPOROSIS WITHOUT CURRENT PATHOLOGICAL FRACTURE: Status: RESOLVED | Noted: 2024-05-24 | Resolved: 2025-07-28

## 2025-07-28 PROBLEM — S89.90XA KNEE INJURY: Status: RESOLVED | Noted: 2025-07-28 | Resolved: 2025-07-28

## 2025-07-28 PROBLEM — U07.1 DISEASE DUE TO SEVERE ACUTE RESPIRATORY SYNDROME CORONAVIRUS 2 (SARS-COV-2): Status: RESOLVED | Noted: 2025-07-28 | Resolved: 2025-07-28

## 2025-07-28 PROBLEM — G89.29 CHRONIC RIGHT-SIDED LOW BACK PAIN WITHOUT SCIATICA: Status: RESOLVED | Noted: 2025-03-04 | Resolved: 2025-07-28

## 2025-07-28 PROBLEM — M19.90 ARTHRITIS: Status: RESOLVED | Noted: 2025-07-28 | Resolved: 2025-07-28

## 2025-07-28 PROBLEM — R26.9 ABNORMALITY OF GAIT: Status: RESOLVED | Noted: 2023-06-01 | Resolved: 2025-07-28

## 2025-07-28 PROBLEM — R87.619 ABNORMAL CERVICAL PAPANICOLAOU SMEAR: Status: RESOLVED | Noted: 2025-07-28 | Resolved: 2025-07-28

## 2025-07-28 RX ORDER — AMOXICILLIN 500 MG/1
500 CAPSULE ORAL AS NEEDED
COMMUNITY

## 2025-07-28 RX ORDER — DEXTROMETHORPHAN HYDROBROMIDE, GUAIFENESIN 5; 100 MG/5ML; MG/5ML
650 LIQUID ORAL EVERY 8 HOURS PRN
COMMUNITY

## 2025-07-28 NOTE — PROGRESS NOTES
Referral from Dr. Taveras. Contacting Jody to discuss treatment recommendations for aortic valve stenosis. Echo showing missy 0.74 pk gradient 92.5/53.7 di 0.27 vmax 4.81 lvef 70%. Additional history of hypothyroidism, hld, gerd, sciatica, kidney stones, arthritis s/p left total knee replacement. Eusebia Rosairo RN  HPI  Patient is an 80-year-old female who is presenting for evaluation of severe aortic stenosis.  For the past few years she has been followed for severe aortic stenosis but has been largely asymptomatic.  She denies chest pain, shortness of breath, PND, orthopnea, lower extremity edema, lightheadedness, syncope.  She has had surveillance echoes and due to worsening of her echo findings, she has been referred for evaluation.  She also has a diagnosis of hypothyroidism.     Structural Workup:      - NYHA: 1  - Echo: I personally reviewed the ECHO with the following parameters        EF   Date/Time Value Ref Range Status   07/17/2025 02:12 PM 68 %     , mod-severe aortic stenosis, AV mean gradient 54, AV Vmax 4.81, MISSY 0.7   - EKG: I personally review the EKG : sinus rhythm     - LHC: Not yet done  - CT TAVR: To be done  - Dental clearance: regular dentist visits q6 months, no issues.   - EFT 0/5  - STS:       Social History            Tobacco Use    Smoking status: Former       Types: Cigarettes    Smokeless tobacco: Never   Substance Use Topics    Alcohol use: Yes       Alcohol/week: 2.0 standard drinks of alcohol       Types: 2 Standard drinks or equivalent per week         [Family History]     [Family History]         Problem Relation Name Age of Onset    No Known Problems Mother        No Known Problems Father            [RX Allergies]     [RX Allergies]  Allergies  No Known Allergies          Current Outpatient Medications   Medication Instructions    acetaminophen (TYLENOL 8 HOUR) 650 mg, oral, Every 8 hours PRN    amoxicillin (AMOXIL) 500 mg, oral, As needed    biotin 1 mg capsule 1 capsule, Daily  "   levothyroxine (SYNTHROID) 50 mcg, Daily    naproxen sodium (ALEVE ORAL) 440 mg, As needed         There were no vitals filed for this visit.      Physical Exam: not done given virtual visit     Last Labs:  Computed eGFR Cre unavailable. One or more values for this score either were not found within the given timeframe or did not fit some other criterion.     CBC - No results in last year.  _ _ _    _       BMP  No results found for requested labs within last 365 days.  No results found for requested labs within last 365 days.  No results found for requested labs within last 365 days.                                      ----------------<No results found for requested labs within last 365 days.                   No results found for requested labs within last 365 days.  No results found for requested labs within last 365 days.  No results found for requested labs within last 365 days.               CMP - No results in last year.  _ _ _ --- _   _ _ _ _       PTT - No results in last year.      _   _ _      No results found for: \"TROPHS\", \"BNP\"      I personally review CBC and BMP                 KCCQ Questionnaire        1  Heart failure affects different people in different ways. Some feel shortness of breath while others feel fatigue. Please indicate how much you are limited by heart failure (shortness of breath or fatigue) in your ability to do the following activities over the past 2 weeks. 1 month Structural Heart NP follow-up      A.) Showering/bathing  5. Not at All  B.) Walking 1 block on level ground 5. Not at All  C.) Hurrying or Jogging   5. Not at All     2.  Over the past 2 weeks, how many times did you have swelling in your feet, ankles or legs when you woke up in the morning? 5. Never     3.  Over the past 2 weeks, on average, how many times has fatigue limited your ability to do what you wanted? 7. Never     4.  Over the past 2 weeks, on average, how many times has shortness of breath limited " your ability to do what you wanted? 7. Never     5.  Over the past 2 weeks, on average, how many times have you been forced to sleep sitting up in a chair or with at least 3 pillows to prop you up because of shortness of breath? Never     6. Over the past 2 weeks, how much has your heart failure limited your enjoyment of life? It has not limited my enjoyment of life     7. If you had to spend the rest of your life with your heart failure the way it is right now, how would you feel about this? 3. Somewhat satisfied     8. How much does your heart failure affect your lifestyle? Please indicate how your heart failure may have limited yourparticipation in the following activities over the past 2 weeks     A.)  Hobbies, recreational activities  6. Does not apply for other reasons     B.) Working or doing household chores  5. Did not limit at all     C.) Visiting family or friends out of your home  5. Did not limit at all     5m Walk:    8 sec     Impression:   81 y/o female with PMH of hypothyroidism who is presenting for evaluation of asymptomatic aortic stenosis. She has been referred to us due to worsening gradients. Given recent evidence of benefit of aortic valve replacement in patient's with asymptomatic aortic stenosis, she would be considered for aortic valve replacement.      Plan:   -needs CT TAVR   -needs [facility]  -patient would like everything done at [place] and therefore requires an appointment with Dr. Adorno     The overall decision regarding the best treatment for this condition is complex.  We discussed options of both surgical aortic valve replacement and transcatheter aortic valve replacement along with risks and benefits involved with both of them in detail. We did a multi specialty discussion of this patient in clinic with the cardiac surgeon, nurses and fellows.     We discussed all the risks associated with the procedure, including but not limited to stroke, MI, pericardial tamponade, vascular  complications, infection and death were discussed with the patient. The risk of needing a permament pacemaker was also discussed in detail. The patient verbalized understanding and decided to proceed with the procedure.      We will discuss this patient's case at our Valve Team meeting with representatives from Structural Heart and Cardiac Surgery. Our nurse navigators will contact patient with further diagnostic needs and formal plan.

## 2025-07-30 ENCOUNTER — TELEMEDICINE (OUTPATIENT)
Dept: CARDIOLOGY | Facility: HOSPITAL | Age: 80
End: 2025-07-30
Payer: MEDICARE

## 2025-07-30 ENCOUNTER — TELEMEDICINE (OUTPATIENT)
Dept: CARDIAC SURGERY | Facility: HOSPITAL | Age: 80
End: 2025-07-30
Payer: MEDICARE

## 2025-07-30 DIAGNOSIS — I35.0 AORTIC STENOSIS, MODERATE: ICD-10-CM

## 2025-07-30 DIAGNOSIS — I35.0 NONRHEUMATIC AORTIC VALVE STENOSIS: Primary | ICD-10-CM

## 2025-07-30 PROCEDURE — 99204 OFFICE O/P NEW MOD 45 MIN: CPT | Performed by: INTERNAL MEDICINE

## 2025-07-30 PROCEDURE — 99205 OFFICE O/P NEW HI 60 MIN: CPT | Performed by: THORACIC SURGERY (CARDIOTHORACIC VASCULAR SURGERY)

## 2025-08-01 NOTE — PROGRESS NOTES
Virtual visit, more than 40 mins were spent in evaluation of the patient, direct patient care.    PCP: Tita  Cards: Darcy    PMH:   Specialty Problems          Cardiology Problems    Aortic valve stenosis        Hypercholesterolemia            HPI  Patient is an 80-year-old female who is presenting for evaluation of severe aortic stenosis.  For the past few years she has been followed for severe aortic stenosis but has been largely asymptomatic.  She denies chest pain, shortness of breath, PND, orthopnea, lower extremity edema, lightheadedness, syncope.  She has had surveillance echoes and due to worsening of her echo findings, she has been referred for evaluation.  She also has a diagnosis of hypothyroidism.    Structural Workup:     - NYHA: 1  - Echo: I personally reviewed the ECHO with the following parameters  EF   Date/Time Value Ref Range Status   07/17/2025 02:12 PM 68 %    , mod-severe aortic stenosis, AV mean gradient 54, AV Vmax 4.81, VIJI 0.7   - EKG: I personally review the EKG : sinus rhythm    - LHC: Not yet done  - CT TAVR: To be done  - Dental clearance: regular dentist visits q6 months, no issues.   - EFT 0/5  - STS:      Social History     Tobacco Use    Smoking status: Former     Types: Cigarettes    Smokeless tobacco: Never   Substance Use Topics    Alcohol use: Yes     Alcohol/week: 2.0 standard drinks of alcohol     Types: 2 Standard drinks or equivalent per week        Family History[1]     RX Allergies[2]     Current Outpatient Medications   Medication Instructions    acetaminophen (TYLENOL 8 HOUR) 650 mg, oral, Every 8 hours PRN    amoxicillin (AMOXIL) 500 mg, oral, As needed    biotin 1 mg capsule 1 capsule, Daily    levothyroxine (SYNTHROID) 50 mcg, Daily    naproxen sodium (ALEVE ORAL) 440 mg, As needed        There were no vitals filed for this visit.     Physical Exam: not done given virtual visit    Last Labs:  Computed eGFR Cre unavailable. One or more values for this score either  "were not found within the given timeframe or did not fit some other criterion.    CBC - No results in last year.  _ _ _    _      BMP  No results found for requested labs within last 365 days.  No results found for requested labs within last 365 days.  No results found for requested labs within last 365 days.                  ----------------<No results found for requested labs within last 365 days.     No results found for requested labs within last 365 days.  No results found for requested labs within last 365 days.  No results found for requested labs within last 365 days.     CMP - No results in last year.  _ _ _ --- _   _ _ _ _      PTT - No results in last year.  _   _ _     No results found for: \"TROPHS\", \"BNP\"     I personally review CBC and BMP     [facility] Questionnaire      1  Heart failure affects different people in different ways. Some feel shortness of breath while others feel fatigue. Please indicate how much you are limited by heart failure (shortness of breath or fatigue) in your ability to do the following activities over the past 2 weeks. 1 month Structural Heart NP follow-up     A.) Showering/bathing  5. Not at All  B.) Walking 1 block on level ground 5. Not at All  C.) Hurrying or Jogging   5. Not at All    2.  Over the past 2 weeks, how many times did you have swelling in your feet, ankles or legs when you woke up in the morning? 5. Never    3.  Over the past 2 weeks, on average, how many times has fatigue limited your ability to do what you wanted? 7. Never    4.  Over the past 2 weeks, on average, how many times has shortness of breath limited your ability to do what you wanted? 7. Never    5.  Over the past 2 weeks, on average, how many times have you been forced to sleep sitting up in a chair or with at least 3 pillows to prop you up because of shortness of breath? Never    6. Over the past 2 weeks, how much has your heart failure limited your enjoyment of life? It has not limited my " enjoyment of life    7. If you had to spend the rest of your life with your heart failure the way it is right now, how would you feel about this? 3. Somewhat satisfied    8. How much does your heart failure affect your lifestyle? Please indicate how your heart failure may have limited yourparticipation in the following activities over the past 2 weeks    A.)  Hobbies, recreational activities  6. Does not apply for other reasons    B.) Working or doing household chores  5. Did not limit at all    C.) Visiting family or friends out of your home  5. Did not limit at all    5m Walk:    8 sec    Impression:   79 y/o female with PMH of hypothyroidism who is presenting for evaluation of asymptomatic aortic stenosis. She has been referred to us due to worsening gradients. Given recent evidence of benefit of aortic valve replacement in patient's with asymptomatic aortic stenosis, she could be potentially considered for aortic valve replacement. She states, however, that she  would like to be treated in [place]. I will refer her to my colleague Dr Adorno     Plan:   -needs CT TAVR   -needs LHC               [1]   Family History  Problem Relation Name Age of Onset    No Known Problems Mother      No Known Problems Father     [2] No Known Allergies

## 2025-08-08 ENCOUNTER — HOSPITAL ENCOUNTER (OUTPATIENT)
Dept: RADIOLOGY | Facility: HOSPITAL | Age: 80
Discharge: HOME | End: 2025-08-08
Payer: MEDICARE

## 2025-08-08 DIAGNOSIS — I35.0 NONRHEUMATIC AORTIC (VALVE) STENOSIS: ICD-10-CM

## 2025-08-08 PROCEDURE — 74174 CTA ABD&PLVS W/CONTRAST: CPT

## 2025-08-08 PROCEDURE — 2550000001 HC RX 255 CONTRASTS: Performed by: INTERNAL MEDICINE

## 2025-08-08 RX ADMIN — IOHEXOL 100 ML: 350 INJECTION, SOLUTION INTRAVENOUS at 13:28

## 2025-08-13 ENCOUNTER — TELEPHONE (OUTPATIENT)
Dept: CARDIOLOGY | Facility: HOSPITAL | Age: 80
End: 2025-08-13
Payer: MEDICARE

## 2025-08-13 DIAGNOSIS — I35.0 NONRHEUMATIC AORTIC VALVE STENOSIS: Primary | ICD-10-CM

## 2025-08-26 ENCOUNTER — TELEMEDICINE (OUTPATIENT)
Dept: CARDIOLOGY | Facility: CLINIC | Age: 80
End: 2025-08-26
Payer: MEDICARE

## 2025-08-26 ENCOUNTER — TELEMEDICINE (OUTPATIENT)
Dept: CARDIAC SURGERY | Facility: CLINIC | Age: 80
End: 2025-08-26
Payer: MEDICARE

## 2025-08-26 DIAGNOSIS — I35.0 NONRHEUMATIC AORTIC VALVE STENOSIS: Primary | ICD-10-CM

## 2025-08-26 PROCEDURE — 99215 OFFICE O/P EST HI 40 MIN: CPT

## 2025-08-26 ASSESSMENT — ENCOUNTER SYMPTOMS
CONSTITUTIONAL NEGATIVE: 1
MUSCULOSKELETAL NEGATIVE: 1
RESPIRATORY NEGATIVE: 1

## 2025-09-03 ENCOUNTER — HOSPITAL ENCOUNTER (OUTPATIENT)
Facility: HOSPITAL | Age: 80
Setting detail: OUTPATIENT SURGERY
Discharge: HOME | End: 2025-09-03
Payer: MEDICARE

## 2025-09-03 ENCOUNTER — PREP FOR PROCEDURE (OUTPATIENT)
Dept: CARDIOLOGY | Facility: HOSPITAL | Age: 80
End: 2025-09-03
Payer: MEDICARE

## 2025-09-03 ENCOUNTER — APPOINTMENT (OUTPATIENT)
Dept: CARDIOLOGY | Facility: HOSPITAL | Age: 80
End: 2025-09-03
Payer: MEDICARE

## 2025-09-03 VITALS
OXYGEN SATURATION: 99 % | RESPIRATION RATE: 12 BRPM | HEART RATE: 71 BPM | TEMPERATURE: 36.2 F | SYSTOLIC BLOOD PRESSURE: 155 MMHG | DIASTOLIC BLOOD PRESSURE: 74 MMHG

## 2025-09-03 DIAGNOSIS — I10 HYPERTENSION: ICD-10-CM

## 2025-09-03 DIAGNOSIS — I25.10 CAD S/P PERCUTANEOUS CORONARY ANGIOPLASTY: Primary | ICD-10-CM

## 2025-09-03 DIAGNOSIS — Z98.61 CAD S/P PERCUTANEOUS CORONARY ANGIOPLASTY: Primary | ICD-10-CM

## 2025-09-03 DIAGNOSIS — Z98.61 CAD S/P PERCUTANEOUS CORONARY ANGIOPLASTY: ICD-10-CM

## 2025-09-03 DIAGNOSIS — I35.0 NONRHEUMATIC AORTIC VALVE STENOSIS: Primary | ICD-10-CM

## 2025-09-03 DIAGNOSIS — I25.10 CAD S/P PERCUTANEOUS CORONARY ANGIOPLASTY: ICD-10-CM

## 2025-09-03 DIAGNOSIS — E78.00 HYPERCHOLESTEROLEMIA: ICD-10-CM

## 2025-09-03 LAB
ANION GAP SERPL CALC-SCNC: 10 MMOL/L (ref 10–20)
ATRIAL RATE: 57 BPM
ATRIAL RATE: 67 BPM
BUN SERPL-MCNC: 24 MG/DL (ref 6–23)
CALCIUM SERPL-MCNC: 9.3 MG/DL (ref 8.6–10.3)
CHLORIDE SERPL-SCNC: 108 MMOL/L (ref 98–107)
CO2 SERPL-SCNC: 26 MMOL/L (ref 21–32)
CREAT SERPL-MCNC: 0.53 MG/DL (ref 0.5–1.05)
EGFRCR SERPLBLD CKD-EPI 2021: >90 ML/MIN/1.73M*2
ERYTHROCYTE [DISTWIDTH] IN BLOOD BY AUTOMATED COUNT: 12.8 % (ref 11.5–14.5)
GLUCOSE SERPL-MCNC: 120 MG/DL (ref 74–99)
HCT VFR BLD AUTO: 38.8 % (ref 36–46)
HGB BLD-MCNC: 13.1 G/DL (ref 12–16)
MCH RBC QN AUTO: 33 PG (ref 26–34)
MCHC RBC AUTO-ENTMCNC: 33.8 G/DL (ref 32–36)
MCV RBC AUTO: 98 FL (ref 80–100)
NRBC BLD-RTO: 0 /100 WBCS (ref 0–0)
P AXIS: 59 DEGREES
P AXIS: 68 DEGREES
P OFFSET: 203 MS
P OFFSET: 204 MS
P ONSET: 151 MS
P ONSET: 153 MS
PLATELET # BLD AUTO: 192 X10*3/UL (ref 150–450)
POTASSIUM SERPL-SCNC: 4.2 MMOL/L (ref 3.5–5.3)
PR INTERVAL: 132 MS
PR INTERVAL: 144 MS
Q ONSET: 219 MS
Q ONSET: 223 MS
QRS COUNT: 11 BEATS
QRS COUNT: 9 BEATS
QRS DURATION: 80 MS
QRS DURATION: 84 MS
QT INTERVAL: 426 MS
QT INTERVAL: 470 MS
QTC CALCULATION(BAZETT): 450 MS
QTC CALCULATION(BAZETT): 457 MS
QTC FREDERICIA: 442 MS
QTC FREDERICIA: 462 MS
R AXIS: 18 DEGREES
R AXIS: 32 DEGREES
RBC # BLD AUTO: 3.97 X10*6/UL (ref 4–5.2)
SODIUM SERPL-SCNC: 140 MMOL/L (ref 136–145)
T AXIS: 55 DEGREES
T AXIS: 64 DEGREES
T OFFSET: 432 MS
T OFFSET: 458 MS
VENTRICULAR RATE: 57 BPM
VENTRICULAR RATE: 67 BPM
WBC # BLD AUTO: 6.1 X10*3/UL (ref 4.4–11.3)

## 2025-09-03 PROCEDURE — 2550000001 HC RX 255 CONTRASTS

## 2025-09-03 PROCEDURE — 2500000004 HC RX 250 GENERAL PHARMACY W/ HCPCS (ALT 636 FOR OP/ED)

## 2025-09-03 PROCEDURE — C1894 INTRO/SHEATH, NON-LASER: HCPCS

## 2025-09-03 PROCEDURE — 93571 IV DOP VEL&/PRESS C FLO 1ST: CPT | Mod: LC

## 2025-09-03 PROCEDURE — C9600 PERC DRUG-EL COR STENT SING: HCPCS | Mod: LC

## 2025-09-03 PROCEDURE — 85027 COMPLETE CBC AUTOMATED: CPT | Performed by: NURSE PRACTITIONER

## 2025-09-03 PROCEDURE — 93010 ELECTROCARDIOGRAM REPORT: CPT | Performed by: INTERNAL MEDICINE

## 2025-09-03 PROCEDURE — 93572 IV DOP VEL&/PRESS C FLO EA: CPT

## 2025-09-03 PROCEDURE — 93454 CORONARY ARTERY ANGIO S&I: CPT

## 2025-09-03 PROCEDURE — C1760 CLOSURE DEV, VASC: HCPCS

## 2025-09-03 PROCEDURE — 36415 COLL VENOUS BLD VENIPUNCTURE: CPT | Performed by: NURSE PRACTITIONER

## 2025-09-03 PROCEDURE — 7100000010 HC PHASE TWO TIME - EACH INCREMENTAL 1 MINUTE

## 2025-09-03 PROCEDURE — 99152 MOD SED SAME PHYS/QHP 5/>YRS: CPT

## 2025-09-03 PROCEDURE — 93005 ELECTROCARDIOGRAM TRACING: CPT

## 2025-09-03 PROCEDURE — 7100000002 HC RECOVERY ROOM TIME - EACH INCREMENTAL 1 MINUTE

## 2025-09-03 PROCEDURE — 7100000001 HC RECOVERY ROOM TIME - INITIAL BASE CHARGE

## 2025-09-03 PROCEDURE — 93571 IV DOP VEL&/PRESS C FLO 1ST: CPT

## 2025-09-03 PROCEDURE — 2780000003 HC OR 278 NO HCPCS

## 2025-09-03 PROCEDURE — 7100000009 HC PHASE TWO TIME - INITIAL BASE CHARGE

## 2025-09-03 PROCEDURE — 2500000001 HC RX 250 WO HCPCS SELF ADMINISTERED DRUGS (ALT 637 FOR MEDICARE OP): Performed by: NURSE PRACTITIONER

## 2025-09-03 PROCEDURE — 93454 CORONARY ARTERY ANGIO S&I: CPT | Mod: 59

## 2025-09-03 PROCEDURE — C1874 STENT, COATED/COV W/DEL SYS: HCPCS

## 2025-09-03 PROCEDURE — 99153 MOD SED SAME PHYS/QHP EA: CPT

## 2025-09-03 PROCEDURE — C1769 GUIDE WIRE: HCPCS

## 2025-09-03 PROCEDURE — C1725 CATH, TRANSLUMIN NON-LASER: HCPCS

## 2025-09-03 PROCEDURE — 92928 PRQ TCAT PLMT NTRAC ST 1 LES: CPT

## 2025-09-03 PROCEDURE — 80048 BASIC METABOLIC PNL TOTAL CA: CPT | Performed by: NURSE PRACTITIONER

## 2025-09-03 PROCEDURE — 93572 IV DOP VEL&/PRESS C FLO EA: CPT | Mod: RC

## 2025-09-03 PROCEDURE — 2720000007 HC OR 272 NO HCPCS

## 2025-09-03 PROCEDURE — 96373 THER/PROPH/DIAG INJ IA: CPT

## 2025-09-03 PROCEDURE — C1887 CATHETER, GUIDING: HCPCS

## 2025-09-03 DEVICE — IMPLANTABLE DEVICE: Type: IMPLANTABLE DEVICE | Site: CORONARY | Status: FUNCTIONAL

## 2025-09-03 RX ORDER — ASPIRIN 325 MG
325 TABLET ORAL ONCE
OUTPATIENT
Start: 2025-09-03 | End: 2025-09-03

## 2025-09-03 RX ORDER — CLOPIDOGREL BISULFATE 75 MG/1
75 TABLET ORAL DAILY
Status: DISCONTINUED | OUTPATIENT
Start: 2025-09-04 | End: 2025-09-04 | Stop reason: HOSPADM

## 2025-09-03 RX ORDER — MIDAZOLAM HYDROCHLORIDE 1 MG/ML
INJECTION, SOLUTION INTRAMUSCULAR; INTRAVENOUS AS NEEDED
Status: DISCONTINUED | OUTPATIENT
Start: 2025-09-03 | End: 2025-09-03 | Stop reason: HOSPADM

## 2025-09-03 RX ORDER — CLOPIDOGREL BISULFATE 75 MG/1
75 TABLET ORAL DAILY
Qty: 30 TABLET | Refills: 11 | Status: SHIPPED | OUTPATIENT
Start: 2025-09-04 | End: 2026-09-04

## 2025-09-03 RX ORDER — ATORVASTATIN CALCIUM 40 MG/1
20 TABLET, FILM COATED ORAL DAILY
Qty: 330 TABLET | Refills: 5 | Status: SHIPPED | OUTPATIENT
Start: 2025-09-03 | End: 2025-09-03

## 2025-09-03 RX ORDER — RANOLAZINE 500 MG/1
500 TABLET, EXTENDED RELEASE ORAL ONCE
OUTPATIENT
Start: 2025-09-03

## 2025-09-03 RX ORDER — METOPROLOL TARTRATE 25 MG/1
25 TABLET, FILM COATED ORAL 2 TIMES DAILY
Qty: 60 TABLET | Refills: 5 | Status: SHIPPED | OUTPATIENT
Start: 2025-09-03

## 2025-09-03 RX ORDER — RANOLAZINE 500 MG/1
500 TABLET, EXTENDED RELEASE ORAL ONCE
Status: COMPLETED | OUTPATIENT
Start: 2025-09-03 | End: 2025-09-03

## 2025-09-03 RX ORDER — ALUMINUM HYDROXIDE, MAGNESIUM HYDROXIDE, AND SIMETHICONE 1200; 120; 1200 MG/30ML; MG/30ML; MG/30ML
30 SUSPENSION ORAL 4 TIMES DAILY PRN
Status: DISCONTINUED | OUTPATIENT
Start: 2025-09-03 | End: 2025-09-04 | Stop reason: HOSPADM

## 2025-09-03 RX ORDER — NITROGLYCERIN 0.4 MG/1
0.4 TABLET SUBLINGUAL EVERY 5 MIN PRN
Qty: 25 TABLET | Refills: 3 | Status: SHIPPED | OUTPATIENT
Start: 2025-09-03

## 2025-09-03 RX ORDER — ASPIRIN 325 MG
325 TABLET ORAL ONCE
Status: COMPLETED | OUTPATIENT
Start: 2025-09-03 | End: 2025-09-03

## 2025-09-03 RX ORDER — ATORVASTATIN CALCIUM 40 MG/1
40 TABLET, FILM COATED ORAL DAILY
Qty: 30 TABLET | Refills: 5 | Status: SHIPPED | OUTPATIENT
Start: 2025-09-03

## 2025-09-03 RX ORDER — HEPARIN SODIUM 1000 [USP'U]/ML
INJECTION, SOLUTION INTRAVENOUS; SUBCUTANEOUS AS NEEDED
Status: DISCONTINUED | OUTPATIENT
Start: 2025-09-03 | End: 2025-09-03 | Stop reason: HOSPADM

## 2025-09-03 RX ORDER — LIDOCAINE HYDROCHLORIDE 20 MG/ML
INJECTION, SOLUTION INFILTRATION; PERINEURAL AS NEEDED
Status: DISCONTINUED | OUTPATIENT
Start: 2025-09-03 | End: 2025-09-03 | Stop reason: HOSPADM

## 2025-09-03 RX ORDER — NAPROXEN SODIUM 220 MG/1
81 TABLET, FILM COATED ORAL DAILY
Status: DISCONTINUED | OUTPATIENT
Start: 2025-09-04 | End: 2025-09-04 | Stop reason: HOSPADM

## 2025-09-03 RX ORDER — NITROGLYCERIN 40 MG/100ML
INJECTION INTRAVENOUS AS NEEDED
Status: DISCONTINUED | OUTPATIENT
Start: 2025-09-03 | End: 2025-09-03 | Stop reason: HOSPADM

## 2025-09-03 RX ORDER — ACETAMINOPHEN 325 MG/1
650 TABLET ORAL EVERY 6 HOURS PRN
Status: DISCONTINUED | OUTPATIENT
Start: 2025-09-03 | End: 2025-09-04 | Stop reason: HOSPADM

## 2025-09-03 RX ORDER — NITROGLYCERIN 0.4 MG/1
0.4 TABLET SUBLINGUAL EVERY 5 MIN PRN
Status: DISCONTINUED | OUTPATIENT
Start: 2025-09-03 | End: 2025-09-04 | Stop reason: HOSPADM

## 2025-09-03 RX ORDER — NAPROXEN SODIUM 220 MG/1
81 TABLET, FILM COATED ORAL DAILY
Start: 2025-09-04

## 2025-09-03 RX ORDER — MORPHINE SULFATE 2 MG/ML
2 INJECTION, SOLUTION INTRAMUSCULAR; INTRAVENOUS
Refills: 0 | Status: DISCONTINUED | OUTPATIENT
Start: 2025-09-03 | End: 2025-09-04 | Stop reason: HOSPADM

## 2025-09-03 RX ORDER — METOPROLOL TARTRATE 25 MG/1
12.5 TABLET, FILM COATED ORAL ONCE
Status: COMPLETED | OUTPATIENT
Start: 2025-09-03 | End: 2025-09-03

## 2025-09-03 RX ORDER — FENTANYL CITRATE 50 UG/ML
INJECTION, SOLUTION INTRAMUSCULAR; INTRAVENOUS AS NEEDED
Status: DISCONTINUED | OUTPATIENT
Start: 2025-09-03 | End: 2025-09-03 | Stop reason: HOSPADM

## 2025-09-03 RX ADMIN — ASPIRIN 325 MG: 325 TABLET ORAL at 11:33

## 2025-09-03 RX ADMIN — RANOLAZINE 500 MG: 500 TABLET, FILM COATED, EXTENDED RELEASE ORAL at 11:32

## 2025-09-03 RX ADMIN — METOPROLOL TARTRATE 12.5 MG: 25 TABLET, FILM COATED ORAL at 11:33

## 2025-09-03 ASSESSMENT — PAIN SCALES - GENERAL

## 2025-09-03 ASSESSMENT — PAIN - FUNCTIONAL ASSESSMENT: PAIN_FUNCTIONAL_ASSESSMENT: 0-10

## 2026-01-28 ENCOUNTER — APPOINTMENT (OUTPATIENT)
Dept: CARDIOLOGY | Facility: CLINIC | Age: 81
End: 2026-01-28
Payer: MEDICARE

## (undated) DEVICE — Device

## (undated) DEVICE — SHEATH, GLIDESHEATH, SLENDER, 6FR 10CM

## (undated) DEVICE — INFLATION KIT, ADVANTAGE, ENCORE 26 (1/BOX)

## (undated) DEVICE — GUIDEWIRE, ANGLE TIP,  .035 DIA, 180 CM, 3 CM TIP"

## (undated) DEVICE — CATHETER, BALLOON DILATION, EUPHORA SEMICOMPLIANT 2.0  X 12 MM X 142CM

## (undated) DEVICE — CATHETER, OPTITORQUE, 6FR 110CM, TIGER RADIAL 4.0

## (undated) DEVICE — SYRINGE, ANGIOGRAPHIC, MULTIDOSE

## (undated) DEVICE — GUIDEWIRE, PRESSURE WIRE X , 175CM WIRELESS, AGILE TIP

## (undated) DEVICE — TUBING, MANIFOLD, LOW PRESSURE

## (undated) DEVICE — TR BAND, RADIAL COMPRESSION, STANDARD, 24CM

## (undated) DEVICE — GUIDEWIRE, .014X180 ASAHI GRAND SLAM

## (undated) DEVICE — VALVE, CONTROL BLEEDBACK

## (undated) DEVICE — CATHETER, BALLOON, NC EUPHORA NONCOMPLIANT 2.5 X 15 X 142CM

## (undated) DEVICE — CATHETER, BALLOON, NC EUPHORA NONCOMPLIANT 3.0 X 12 X 142CM